# Patient Record
Sex: FEMALE | Race: WHITE | NOT HISPANIC OR LATINO | Employment: UNEMPLOYED | URBAN - METROPOLITAN AREA
[De-identification: names, ages, dates, MRNs, and addresses within clinical notes are randomized per-mention and may not be internally consistent; named-entity substitution may affect disease eponyms.]

---

## 2018-10-07 ENCOUNTER — APPOINTMENT (OUTPATIENT)
Dept: RADIOLOGY | Facility: MEDICAL CENTER | Age: 48
End: 2018-10-07
Attending: EMERGENCY MEDICINE
Payer: OTHER MISCELLANEOUS

## 2018-10-07 ENCOUNTER — HOSPITAL ENCOUNTER (EMERGENCY)
Facility: MEDICAL CENTER | Age: 48
End: 2018-10-07
Attending: EMERGENCY MEDICINE
Payer: OTHER MISCELLANEOUS

## 2018-10-07 VITALS
WEIGHT: 220 LBS | SYSTOLIC BLOOD PRESSURE: 124 MMHG | HEIGHT: 64 IN | OXYGEN SATURATION: 93 % | BODY MASS INDEX: 37.56 KG/M2 | TEMPERATURE: 97.5 F | HEART RATE: 102 BPM | DIASTOLIC BLOOD PRESSURE: 84 MMHG | RESPIRATION RATE: 18 BRPM

## 2018-10-07 DIAGNOSIS — S01.01XA LACERATION OF SCALP, INITIAL ENCOUNTER: ICD-10-CM

## 2018-10-07 PROCEDURE — 700111 HCHG RX REV CODE 636 W/ 250 OVERRIDE (IP): Performed by: EMERGENCY MEDICINE

## 2018-10-07 PROCEDURE — 99284 EMERGENCY DEPT VISIT MOD MDM: CPT

## 2018-10-07 PROCEDURE — 90715 TDAP VACCINE 7 YRS/> IM: CPT | Performed by: EMERGENCY MEDICINE

## 2018-10-07 PROCEDURE — 70450 CT HEAD/BRAIN W/O DYE: CPT

## 2018-10-07 PROCEDURE — 304999 HCHG REPAIR-SIMPLE/INTERMED LEVEL 1

## 2018-10-07 PROCEDURE — 90471 IMMUNIZATION ADMIN: CPT

## 2018-10-07 PROCEDURE — 303747 HCHG EXTRA SUTURE

## 2018-10-07 PROCEDURE — 304217 HCHG IRRIGATION SYSTEM

## 2018-10-07 PROCEDURE — 700101 HCHG RX REV CODE 250

## 2018-10-07 RX ORDER — LIDOCAINE HYDROCHLORIDE AND EPINEPHRINE 10; 10 MG/ML; UG/ML
30 INJECTION, SOLUTION INFILTRATION; PERINEURAL ONCE
Status: COMPLETED | OUTPATIENT
Start: 2018-10-07 | End: 2018-10-07

## 2018-10-07 RX ORDER — LIDOCAINE HYDROCHLORIDE AND EPINEPHRINE 10; 10 MG/ML; UG/ML
INJECTION, SOLUTION INFILTRATION; PERINEURAL
Status: COMPLETED
Start: 2018-10-07 | End: 2018-10-07

## 2018-10-07 RX ADMIN — CLOSTRIDIUM TETANI TOXOID ANTIGEN (FORMALDEHYDE INACTIVATED), CORYNEBACTERIUM DIPHTHERIAE TOXOID ANTIGEN (FORMALDEHYDE INACTIVATED), BORDETELLA PERTUSSIS TOXOID ANTIGEN (GLUTARALDEHYDE INACTIVATED), BORDETELLA PERTUSSIS FILAMENTOUS HEMAGGLUTININ ANTIGEN (FORMALDEHYDE INACTIVATED), BORDETELLA PERTUSSIS PERTACTIN ANTIGEN, AND BORDETELLA PERTUSSIS FIMBRIAE 2/3 ANTIGEN 0.5 ML: 5; 2; 2.5; 5; 3; 5 INJECTION, SUSPENSION INTRAMUSCULAR at 15:43

## 2018-10-07 RX ADMIN — LIDOCAINE HYDROCHLORIDE AND EPINEPHRINE 30 ML: 10; 10 INJECTION, SOLUTION INFILTRATION; PERINEURAL at 15:00

## 2018-10-07 ASSESSMENT — PAIN SCALES - GENERAL: PAINLEVEL_OUTOF10: 4

## 2018-10-07 NOTE — ED PROVIDER NOTES
"ED Provider Note    CHIEF COMPLAINT  Chief Complaint   Patient presents with   • T-5000 GLF     no LOC, room was spinning and pt fell after standing up from using the bathroom   • Alcohol Intoxication     4-6 shots today   • Head Laceration     forehead       HPI  Ivonne Loja is a 48 y.o. female who presents with a facial laceration.  The patient states she was drinking today when she stood up from the bathroom and fell down striking her forehead.  The patient presents with a large laceration to the forehead.  She states she did not have a loss of consciousness.  She states she does have a headache.  She does not have any visual changes.  She also denies nausea and vomiting.  She states when she stood up she initially had some dizziness which has resolved.  She does not have any focal weakness.  She is unaware of any other trauma except for to the face.  She states her tetanus is not up-to-date.    REVIEW OF SYSTEMS  See HPI for further details. All other systems are negative.     PAST MEDICAL HISTORY  History reviewed. No pertinent past medical history.    SOCIAL HISTORY  Social History     Social History   • Marital status: N/A     Spouse name: N/A   • Number of children: N/A   • Years of education: N/A     Social History Main Topics   • Smoking status: Current Every Day Smoker     Packs/day: 1.00     Types: Cigarettes   • Smokeless tobacco: Never Used   • Alcohol use Yes   • Drug use: No   • Sexual activity: Not on file     Other Topics Concern   • Not on file     Social History Narrative   • No narrative on file           PHYSICAL EXAM  VITAL SIGNS: /84   Pulse (!) 103   Temp 36.4 °C (97.5 °F)   Resp 16   Ht 1.626 m (5' 4\")   Wt 99.8 kg (220 lb)   BMI 37.76 kg/m²   Constitutional: in acute distress, Non-toxic appearance.   HENT: 4 cm forehead laceration, tympanic membranes are intact and nonerythematous bilaterally, Oropharynx moist without exudates or erythema, Nose normal.   Eyes: PERRLA, " EOMI, Conjunctiva normal.  Neck: Supple without meningismus  Lymphatic: No lymphadenopathy noted.   Cardiovascular: Normal heart rate, Normal rhythm, No murmurs, No rubs, No gallops.   Thorax & Lungs: Normal breath sounds, No respiratory distress, No wheezing, No chest tenderness.   Abdomen: Bowel sounds normal, Soft, No tenderness, no rebound, no guarding, no distention, No masses, No pulsatile masses.   Skin: The forehead laceration described above  Back: No tenderness, No CVA tenderness.   Extremities: Atraumatic with symmetric distal pulses, No edema, No tenderness, No cyanosis, No clubbing.   Neurologic: GCS of 15  Psychiatric: Affect normal, Judgment normal, Mood normal.     PROCEDURES laceration repair  Lidocaine with epinephrine was utilized for local anesthetic.  I then irrigated the wound myself.  I then placed multiple 6-0 Ethilon sutures in a simple acute fashion for primary closure.  RADIOLOGY/  CT-HEAD W/O   Final Result         1. No acute intracranial abnormality. No evidence of acute intracranial hemorrhage or mass lesion.                     COURSE & MEDICAL DECISION MAKING  Pertinent Labs & Imaging studies reviewed. (See chart for details)  This a 48-year-old female who presents after a fall.  She did have primary closure of her forehead laceration.  The patient did have a CT scan performed she does have a headache and was intoxicated during the fall.  This does not show any evidence of intracranial hemorrhage.  The patient will therefore be discharged with instructions to utilize Motrin as needed for discomfort.  She will have the sutures removed in 5 days.  She will return for any signs of infection.  At the time of discharge she has no further evidence of injury.  She did receive tetanus prophylaxis.    FINAL IMPRESSION  1.  4 cm forehead laceration  2.  Mild concussion     Disposition  The patient will be discharged in stable condition    Electronically signed by: Nick Mendiola, 10/7/2018  2:57 PM

## 2018-10-07 NOTE — ED TRIAGE NOTES
Chief Complaint   Patient presents with   • T-5000 GLF     no LOC, room was spinning and pt fell after standing up from using the bathroom   • Alcohol Intoxication     4-6 shots today       Pt brought in by [facility] for GLF, forehead laceration. On EMS Arrival pt was found at the [facility] bathroom. Pt was using the restroom, went to stand up, the room started spinning and she fell and hit her head on an unknown object, no LOC. Pt reports two drinks today with 2-3 shots of alcohol in each. 4 cm lac to forehead     EMS Interventions: None    On arrival to ED pt is A&O x 4

## 2018-10-07 NOTE — ED NOTES
Pt roomed, IV established and blood sent to lab, pt able to ambulate to restroom with unsteady gait. Chart up for ERP eval

## 2018-10-08 NOTE — ED NOTES
Pt verbalizes understanding of discharge and follow-up instructions. Given Rx X0.  VSS.  All questions answered.  Ambulates to discharge with  to waiting room to get cab back to hotel